# Patient Record
Sex: MALE | Race: WHITE | NOT HISPANIC OR LATINO
[De-identification: names, ages, dates, MRNs, and addresses within clinical notes are randomized per-mention and may not be internally consistent; named-entity substitution may affect disease eponyms.]

---

## 2017-05-24 ENCOUNTER — RESULT REVIEW (OUTPATIENT)
Age: 67
End: 2017-05-24

## 2017-06-06 ENCOUNTER — APPOINTMENT (OUTPATIENT)
Dept: UROLOGY | Facility: CLINIC | Age: 67
End: 2017-06-06

## 2018-01-16 ENCOUNTER — APPOINTMENT (OUTPATIENT)
Dept: UROLOGY | Facility: CLINIC | Age: 68
End: 2018-01-16
Payer: MEDICARE

## 2018-01-16 VITALS
HEART RATE: 71 BPM | BODY MASS INDEX: 28.29 KG/M2 | SYSTOLIC BLOOD PRESSURE: 114 MMHG | DIASTOLIC BLOOD PRESSURE: 67 MMHG | RESPIRATION RATE: 16 BRPM | WEIGHT: 191 LBS | TEMPERATURE: 98.4 F | HEIGHT: 69 IN

## 2018-01-16 DIAGNOSIS — Z87.898 PERSONAL HISTORY OF OTHER SPECIFIED CONDITIONS: ICD-10-CM

## 2018-01-16 DIAGNOSIS — N35.9 URETHRAL STRICTURE, UNSPECIFIED: ICD-10-CM

## 2018-01-16 DIAGNOSIS — Z85.46 PERSONAL HISTORY OF MALIGNANT NEOPLASM OF PROSTATE: ICD-10-CM

## 2018-01-16 DIAGNOSIS — Z80.0 FAMILY HISTORY OF MALIGNANT NEOPLASM OF DIGESTIVE ORGANS: ICD-10-CM

## 2018-01-16 DIAGNOSIS — H91.90 UNSPECIFIED HEARING LOSS, UNSPECIFIED EAR: ICD-10-CM

## 2018-01-16 DIAGNOSIS — Z86.69 PERSONAL HISTORY OF OTHER DISEASES OF THE NERVOUS SYSTEM AND SENSE ORGANS: ICD-10-CM

## 2018-01-16 PROCEDURE — 99214 OFFICE O/P EST MOD 30 MIN: CPT

## 2018-01-16 RX ORDER — FOLIC ACID 20 MG
CAPSULE ORAL
Refills: 0 | Status: ACTIVE | COMMUNITY

## 2018-01-16 RX ORDER — LOVASTATIN 40 MG/1
40 TABLET ORAL
Qty: 90 | Refills: 0 | Status: ACTIVE | COMMUNITY
Start: 2017-06-28

## 2018-01-16 RX ORDER — DILTIAZEM HYDROCHLORIDE 180 MG/1
180 CAPSULE, EXTENDED RELEASE ORAL
Qty: 180 | Refills: 0 | Status: ACTIVE | COMMUNITY
Start: 2017-05-09

## 2018-01-16 RX ORDER — ASPIRIN 325 MG/1
TABLET, FILM COATED ORAL
Refills: 0 | Status: ACTIVE | COMMUNITY

## 2018-01-16 RX ORDER — NITROGLYCERIN 0.3 MG/1
0.3 TABLET SUBLINGUAL
Qty: 200 | Refills: 0 | Status: ACTIVE | COMMUNITY
Start: 2017-02-21

## 2018-07-22 PROBLEM — Z80.0 FAMILY HISTORY OF COLON CANCER: Status: ACTIVE | Noted: 2018-01-16

## 2018-07-25 ENCOUNTER — APPOINTMENT (OUTPATIENT)
Dept: UROLOGY | Facility: CLINIC | Age: 68
End: 2018-07-25
Payer: MEDICARE

## 2018-07-25 VITALS
SYSTOLIC BLOOD PRESSURE: 130 MMHG | RESPIRATION RATE: 14 BRPM | HEART RATE: 76 BPM | BODY MASS INDEX: 27.51 KG/M2 | HEIGHT: 69.5 IN | WEIGHT: 190 LBS | DIASTOLIC BLOOD PRESSURE: 80 MMHG | OXYGEN SATURATION: 94 %

## 2018-07-25 DIAGNOSIS — L02.91 CUTANEOUS ABSCESS, UNSPECIFIED: ICD-10-CM

## 2018-07-25 DIAGNOSIS — N50.819 TESTICULAR PAIN, UNSPECIFIED: ICD-10-CM

## 2018-07-25 PROCEDURE — 99213 OFFICE O/P EST LOW 20 MIN: CPT

## 2019-02-20 ENCOUNTER — RESULT REVIEW (OUTPATIENT)
Age: 69
End: 2019-02-20

## 2019-09-16 ENCOUNTER — MEDICATION RENEWAL (OUTPATIENT)
Age: 69
End: 2019-09-16

## 2019-10-02 ENCOUNTER — APPOINTMENT (OUTPATIENT)
Dept: UROLOGY | Facility: CLINIC | Age: 69
End: 2019-10-02
Payer: MEDICARE

## 2019-10-02 VITALS
HEART RATE: 74 BPM | SYSTOLIC BLOOD PRESSURE: 142 MMHG | HEIGHT: 69 IN | WEIGHT: 198 LBS | DIASTOLIC BLOOD PRESSURE: 80 MMHG | BODY MASS INDEX: 29.33 KG/M2 | OXYGEN SATURATION: 95 %

## 2019-10-02 PROCEDURE — 99214 OFFICE O/P EST MOD 30 MIN: CPT

## 2019-10-07 ENCOUNTER — RESULT REVIEW (OUTPATIENT)
Age: 69
End: 2019-10-07

## 2019-10-07 LAB — PSA SERPL-MCNC: 0.15 NG/ML

## 2019-10-11 NOTE — ASSESSMENT
[FreeTextEntry1] : PSA earlier this month at [facility] was 0.156 ng/mL, will repeat PSA today.\par Continue mirabegron 50 mg once daily, RX renewed.

## 2019-10-11 NOTE — HISTORY OF PRESENT ILLNESS
[FreeTextEntry1] : H/o prostate cancer\par s/p Chriss RP, PLND June 2014\par Path: Alber 7 (3+4), pT3N0, pos margin at apex.  \par No adjuvant therapy.\par \par Here for annual follow up.\par PSA June 2018: 0.08 ng/mL.\par PSA March 2019: 0.08 ng/mL\par PSA July 2019 0.156 ng/mL at sunrise \par \par Has been able to achieve full erections without Viagra.  Has complaints of left testis pain which has been on ongoing for years and moderate to severe at times.  No associated scrotal swelling.\par \par Urinary frequency:  remains on mirabegron 50 mg.  Generally well controlled.  Symptoms exacerbated by stress or anxiety.\par \par No other changes in medical hx.

## 2019-10-11 NOTE — PHYSICAL EXAM
[General Appearance - Well Developed] : well developed [General Appearance - Well Nourished] : well nourished [Normal Appearance] : normal appearance [General Appearance - In No Acute Distress] : no acute distress [Well Groomed] : well groomed [Abdomen Soft] : soft [Costovertebral Angle Tenderness] : no ~M costovertebral angle tenderness [Abdomen Tenderness] : non-tender [Urinary Bladder Findings] : the bladder was normal on palpation [Urethral Meatus] : meatus normal [Penis Abnormality] : normal circumcised penis [Testes Mass (___cm)] : there were no testicular masses [Edema] : no peripheral edema [] : no respiratory distress [Respiration, Rhythm And Depth] : normal respiratory rhythm and effort [Exaggerated Use Of Accessory Muscles For Inspiration] : no accessory muscle use [Oriented To Time, Place, And Person] : oriented to person, place, and time [Affect] : the affect was normal [Normal Station and Gait] : the gait and station were normal for the patient's age [FreeTextEntry1] : two small sebaceous cysts noted: one on the dorsum of the penis just proximal to the glands, the other located on the scrotum just distal to the penile shaft in the midline.

## 2019-11-14 ENCOUNTER — RESULT CHARGE (OUTPATIENT)
Age: 69
End: 2019-11-14

## 2019-11-25 ENCOUNTER — APPOINTMENT (OUTPATIENT)
Dept: SPINE | Facility: CLINIC | Age: 69
End: 2019-11-25
Payer: MEDICARE

## 2019-11-25 VITALS
BODY MASS INDEX: 29.33 KG/M2 | DIASTOLIC BLOOD PRESSURE: 79 MMHG | TEMPERATURE: 98 F | SYSTOLIC BLOOD PRESSURE: 132 MMHG | OXYGEN SATURATION: 92 % | HEART RATE: 93 BPM | WEIGHT: 198 LBS | HEIGHT: 69 IN

## 2019-11-25 DIAGNOSIS — Z87.09 PERSONAL HISTORY OF OTHER DISEASES OF THE RESPIRATORY SYSTEM: ICD-10-CM

## 2019-11-25 DIAGNOSIS — Z86.69 PERSONAL HISTORY OF OTHER DISEASES OF THE NERVOUS SYSTEM AND SENSE ORGANS: ICD-10-CM

## 2019-11-25 DIAGNOSIS — Z86.79 PERSONAL HISTORY OF OTHER DISEASES OF THE CIRCULATORY SYSTEM: ICD-10-CM

## 2019-11-25 DIAGNOSIS — Z86.39 PERSONAL HISTORY OF OTHER ENDOCRINE, NUTRITIONAL AND METABOLIC DISEASE: ICD-10-CM

## 2019-11-25 DIAGNOSIS — Z85.038 PERSONAL HISTORY OF OTHER MALIGNANT NEOPLASM OF LARGE INTESTINE: ICD-10-CM

## 2019-11-25 PROCEDURE — 99203 OFFICE O/P NEW LOW 30 MIN: CPT

## 2019-11-25 RX ORDER — FLUTICASONE PROPIONATE 220 MCG
AEROSOL WITH ADAPTER (GRAM) INHALATION
Refills: 0 | Status: ACTIVE | COMMUNITY

## 2019-11-25 RX ORDER — ELECTROLYTES/DEXTROSE
SOLUTION, ORAL ORAL
Refills: 0 | Status: ACTIVE | COMMUNITY

## 2019-11-25 NOTE — REASON FOR VISIT
[Follow-Up: _____] : a [unfilled] follow-up visit [Other: _____] : [unfilled] [FreeTextEntry1] : This 69 year old gentleman is being seen for sudden onset of lower back pain when he suddenly moved quickly.  He is well know to the office for a prior lumbar fusion of L5 S1  in 2003.  The current pain he is experiencing now is on the left side of is back with left sided groin pain and no leg radiculopathy.  He has not had pain management and no PT initiated.  The pain is a 10/10.    He has an unsteady gait. He does not describe any incontinence. An MRI of the lumbar spine shows a left L1 to disc herniation with a superiorly extruded fragment along with degenerative disc disease at L2-3 L3-4 L4-5 there is also a grade 1 spondylolisthesis central stenosis. This patient is on chronic narcotics with pain management.

## 2019-11-25 NOTE — ASSESSMENT
[FreeTextEntry1] : Mr. Ayala is s/p L S1 lumbar fusion from 2003 who suddenly experienced recurrent left lower back pain.  No radicular pain is present.   The lumbar  MRI  show junctional  stenosis above the level of prior fusion.  A large herniated disc is present at L1 L2 and spondylolisthesis is present at L4 L5.  An extensive lumbar fusion was discussed and conservative measures were recommended.  He will follow up with pain management for epidural injections, surgery was not recommended at this time.   He will return to the office in three to four months .

## 2019-11-25 NOTE — PHYSICAL EXAM
[Person] : oriented to person [Place] : oriented to place [Time] : oriented to time [Short Term Intact] : short term memory intact [Remote Intact] : remote memory intact [Span Intact] : the attention span was normal [Concentration Intact] : normal concentrating ability [Fluency] : fluency intact [Past History] : adequate knowledge of personal past history [Current Events] : adequate knowledge of current events [Comprehension] : comprehension intact [Cranial Nerves Optic (II)] : visual acuity intact bilaterally,  pupils equal round and reactive to light [Vocabulary] : adequate range of vocabulary [Cranial Nerves Trigeminal (V)] : facial sensation intact symmetrically [Cranial Nerves Facial (VII)] : face symmetrical [Cranial Nerves Oculomotor (III)] : extraocular motion intact [Cranial Nerves Vestibulocochlear (VIII)] : hearing was intact bilaterally [Cranial Nerves Accessory (XI - Cranial And Spinal)] : head turning and shoulder shrug symmetric [Cranial Nerves Glossopharyngeal (IX)] : tongue and palate midline [Cranial Nerves Hypoglossal (XII)] : there was no tongue deviation with protrusion [Motor Tone] : muscle tone was normal in all four extremities [No Muscle Atrophy] : normal bulk in all four extremities [Motor Strength] : muscle strength was normal in all four extremities [Sensation Tactile Decrease] : light touch was intact [Limited Balance] : the patient's balance was impaired [2+] : Patella left 2+ [Past-pointing] : there was no past-pointing [Tremor] : no tremor present [Munoz] : Munoz's sign was not demonstrated [FreeTextEntry8] : antalgic gait [Straight-Leg Raise Test - Left] : straight leg raise of the left leg was negative [Straight-Leg Raise Test - Right] : straight leg raise  of the right leg was negative [FreeTextEntry1] : well-healed lower lumbar paraspinal skin incisions

## 2020-01-15 ENCOUNTER — APPOINTMENT (OUTPATIENT)
Dept: UROLOGY | Facility: CLINIC | Age: 70
End: 2020-01-15

## 2020-01-21 ENCOUNTER — APPOINTMENT (OUTPATIENT)
Dept: UROLOGY | Facility: CLINIC | Age: 70
End: 2020-01-21
Payer: MEDICARE

## 2020-01-21 LAB — PSA SERPL-MCNC: 0.19 NG/ML

## 2020-01-21 PROCEDURE — 99214 OFFICE O/P EST MOD 30 MIN: CPT

## 2020-01-21 NOTE — PHYSICAL EXAM
[General Appearance - Well Developed] : well developed [General Appearance - Well Nourished] : well nourished [Normal Appearance] : normal appearance [Well Groomed] : well groomed [General Appearance - In No Acute Distress] : no acute distress [Abdomen Soft] : soft [Abdomen Tenderness] : non-tender [Costovertebral Angle Tenderness] : no ~M costovertebral angle tenderness [Urinary Bladder Findings] : the bladder was normal on palpation [Edema] : no peripheral edema [] : no respiratory distress [Respiration, Rhythm And Depth] : normal respiratory rhythm and effort [Exaggerated Use Of Accessory Muscles For Inspiration] : no accessory muscle use [Normal Station and Gait] : the gait and station were normal for the patient's age [No Palpable Adenopathy] : no palpable adenopathy [FreeTextEntry1] : PVR 59 mL

## 2020-01-21 NOTE — HISTORY OF PRESENT ILLNESS
[FreeTextEntry1] : H/o prostate cancer\par s/p Chriss RP, PLND June 2014\par Path: Alber 7 (3+4), pT3N0, pos margin at apex.  \par No adjuvant therapy.\par \par Rising PSA posttreatment.\par PSA 1/7/2020: 0.19 ng/mL.\par PSA 10/2/2019: 0.15 ng/mL.\par \par Urinary frequency:  remains on mirabegron 50 mg.  Recently had exacerbation of his lumbar spine stenosis.  Has developed weakness of the right lower extremity.  At the same time noted an improvement in urinary frequency.  Urine flow and bladder emptying remain normal.\par \par Had MRI of the spine which showed progression of his lumbar disc disease and stenosis.\par \par No other changes in medical hx.

## 2020-01-21 NOTE — ASSESSMENT
[FreeTextEntry1] : Reviewed PSA results.  We will continue to follow with PSA every 3 months.  Discussed the role of Axumin PET/CT if his PSA rises to above 0.3 ng/mL.\par \par Urinary frequency: Reviewed the MRI report and images.  Decrease in urinary frequency likely secondary to some neurologic involvement of the distal cord.  Agree with recommendation for surgical reconstruction if needed.  No need for change in medication regarding urinary function.

## 2020-03-16 ENCOUNTER — APPOINTMENT (OUTPATIENT)
Dept: SPINE | Facility: CLINIC | Age: 70
End: 2020-03-16

## 2020-05-11 LAB — PSA SERPL-MCNC: 0.2 NG/ML

## 2020-06-08 ENCOUNTER — APPOINTMENT (OUTPATIENT)
Dept: SPINE | Facility: CLINIC | Age: 70
End: 2020-06-08

## 2020-06-18 PROBLEM — N50.819 ORCHALGIA: Status: ACTIVE | Noted: 2018-07-25

## 2020-09-29 ENCOUNTER — APPOINTMENT (OUTPATIENT)
Dept: UROLOGY | Facility: CLINIC | Age: 70
End: 2020-09-29
Payer: MEDICARE

## 2020-09-29 VITALS
HEART RATE: 90 BPM | BODY MASS INDEX: 29.47 KG/M2 | WEIGHT: 199 LBS | DIASTOLIC BLOOD PRESSURE: 81 MMHG | HEIGHT: 69 IN | SYSTOLIC BLOOD PRESSURE: 145 MMHG | RESPIRATION RATE: 17 BRPM

## 2020-09-29 VITALS — TEMPERATURE: 97.3 F

## 2020-09-29 LAB — PSA SERPL-MCNC: 0.21 NG/ML

## 2020-09-29 PROCEDURE — 99214 OFFICE O/P EST MOD 30 MIN: CPT

## 2020-09-29 RX ORDER — MIRABEGRON 50 MG/1
50 TABLET, FILM COATED, EXTENDED RELEASE ORAL
Refills: 0 | Status: COMPLETED | COMMUNITY
End: 2020-09-29

## 2020-09-29 NOTE — HISTORY OF PRESENT ILLNESS
[FreeTextEntry1] : H/o prostate cancer\par s/p Chriss RP, PLND June 2014\par Path: Alber 7 (3+4), pT3N0, pos margin at apex.  \par No adjuvant therapy.\par \par Rising PSA posttreatment.\par \par Urinary frequency:  remains on mirabegron 50 mg.  Recently had exacerbation of his lumbar spine stenosis.  Has developed weakness of the right lower extremity.  At the same time noted an improvement in urinary frequency.  Urine flow and bladder emptying remain normal.\par \par Had MRI of the spine which showed progression of his lumbar disc disease and stenosis.\par \par No other changes in medical hx.

## 2020-09-29 NOTE — DISEASE MANAGEMENT
[1] : T1 [c] : c [X] : MX [0-10] : 0 -10 ng/mL [7(4+3)] : Alber Score 7(4+3) [4] : 4 [IIIB] : IIIB [Radical Prostatectomy] : Radical Prostatectomy [Patient had a radical prostatectomy] : Patient had a radical prostatectomy  [7(3+4)] : Alber Score 7(3+4) [Negative] : Negative margins [3] : T3 [a] : a [0] : N0 [] : Patient had no Bone Scan performed [BiopsyDate] : 4/3/2014 [MeasuredProstateVolume] : 29 mL [TotalCores] : 18 [TotalPositiveCores] : 10 [MaxCoreInvolvement] : 75% [RadicalProstatectomyDate] : 06/30/2014 [TotalNumberofnodesresected] : 18 [PositiveNodes] : 0

## 2020-10-06 LAB
ESTRADIOL SERPL-MCNC: 24 PG/ML
TESTOST BND SERPL-MCNC: 6.5 PG/ML
TESTOST SERPL-MCNC: 698.2 NG/DL

## 2020-11-16 ENCOUNTER — NON-APPOINTMENT (OUTPATIENT)
Age: 70
End: 2020-11-16

## 2020-11-19 ENCOUNTER — APPOINTMENT (OUTPATIENT)
Dept: UROLOGY | Facility: CLINIC | Age: 70
End: 2020-11-19
Payer: MEDICARE

## 2020-11-19 DIAGNOSIS — R39.12 POOR URINARY STREAM: ICD-10-CM

## 2020-11-19 LAB
BILIRUB UR QL STRIP: NEGATIVE
CLARITY UR: NORMAL
COLLECTION METHOD: NORMAL
GLUCOSE UR-MCNC: NEGATIVE
HCG UR QL: 0.2 EU/DL
HGB UR QL STRIP.AUTO: NORMAL
KETONES UR-MCNC: NEGATIVE
LEUKOCYTE ESTERASE UR QL STRIP: NEGATIVE
NITRITE UR QL STRIP: NEGATIVE
PH UR STRIP: 7.5
PROT UR STRIP-MCNC: NORMAL
SP GR UR STRIP: 1.02

## 2020-11-19 PROCEDURE — 99214 OFFICE O/P EST MOD 30 MIN: CPT

## 2020-11-24 LAB
ANION GAP SERPL CALC-SCNC: 12 MMOL/L
BUN SERPL-MCNC: 20 MG/DL
CALCIUM SERPL-MCNC: 9.6 MG/DL
CHLORIDE SERPL-SCNC: 101 MMOL/L
CO2 SERPL-SCNC: 26 MMOL/L
CREAT SERPL-MCNC: 1.1 MG/DL
GLUCOSE SERPL-MCNC: 106 MG/DL
POTASSIUM SERPL-SCNC: 4.7 MMOL/L
PSA SERPL-MCNC: 0.28 NG/ML
SODIUM SERPL-SCNC: 138 MMOL/L

## 2020-11-24 NOTE — HISTORY OF PRESENT ILLNESS
[FreeTextEntry1] : H/o prostate cancer\par s/p Chriss RP, PLND June 2014\par Path: Alber 7 (3+4), pT3N0, pos margin at apex.  \par No adjuvant therapy.\par Most recent PSA 0.21 ng/mL, 9/9/2020\par \par Had MRI of the spine which showed progression of his lumbar disc disease and stenosis.\par \par Several days ago onset of back pain, worsening difficulty to void.  Feels that not emptying the bladder. At baseline, voids very frequently.  No incontinence.  No abdominal pain.  No fever/chills, N/V.  No constipation.

## 2020-11-24 NOTE — PHYSICAL EXAM
[General Appearance - Well Developed] : well developed [General Appearance - Well Nourished] : well nourished [Normal Appearance] : normal appearance [Well Groomed] : well groomed [General Appearance - In No Acute Distress] : no acute distress [Abdomen Soft] : soft [Abdomen Tenderness] : non-tender [Costovertebral Angle Tenderness] : no ~M costovertebral angle tenderness [Urinary Bladder Findings] : the bladder was normal on palpation [Heart Rate And Rhythm] : Heart rate and rhythm were normal [Edema] : no peripheral edema [] : no respiratory distress [Respiration, Rhythm And Depth] : normal respiratory rhythm and effort [Exaggerated Use Of Accessory Muscles For Inspiration] : no accessory muscle use [Normal Station and Gait] : the gait and station were normal for the patient's age [No Palpable Adenopathy] : no palpable adenopathy

## 2020-11-27 ENCOUNTER — OUTPATIENT (OUTPATIENT)
Dept: OUTPATIENT SERVICES | Facility: HOSPITAL | Age: 70
LOS: 1 days | End: 2020-11-27
Payer: COMMERCIAL

## 2020-11-27 ENCOUNTER — APPOINTMENT (OUTPATIENT)
Dept: CT IMAGING | Facility: CLINIC | Age: 70
End: 2020-11-27
Payer: MEDICARE

## 2020-11-27 DIAGNOSIS — H53.009 UNSPECIFIED AMBLYOPIA, UNSPECIFIED EYE: Chronic | ICD-10-CM

## 2020-11-27 DIAGNOSIS — M54.9 DORSALGIA, UNSPECIFIED: ICD-10-CM

## 2020-11-27 DIAGNOSIS — R35.0 FREQUENCY OF MICTURITION: ICD-10-CM

## 2020-11-27 DIAGNOSIS — H26.9 UNSPECIFIED CATARACT: Chronic | ICD-10-CM

## 2020-11-27 DIAGNOSIS — Z98.89 OTHER SPECIFIED POSTPROCEDURAL STATES: Chronic | ICD-10-CM

## 2020-11-27 DIAGNOSIS — Z98.1 ARTHRODESIS STATUS: Chronic | ICD-10-CM

## 2020-11-27 PROCEDURE — 74176 CT ABD & PELVIS W/O CONTRAST: CPT

## 2020-11-27 PROCEDURE — 74176 CT ABD & PELVIS W/O CONTRAST: CPT | Mod: 26

## 2020-11-29 NOTE — ASSESSMENT
[FreeTextEntry1] : Postvoid residual: Less than 10 mL.\par Recommend labs today including CMP, PSA.\par Urinalysis dip suspicious for possible urinary tract infection.  Prescription for Bactrim twice daily sent to the pharmacy.\par \par If persistent symptoms, then will schedule for CT scan abdomen/pelvis to rule out distal ureteral stones. This document is complete and the patient is ready for discharge.

## 2020-11-30 ENCOUNTER — NON-APPOINTMENT (OUTPATIENT)
Age: 70
End: 2020-11-30

## 2021-01-11 ENCOUNTER — APPOINTMENT (OUTPATIENT)
Dept: SPINE | Facility: CLINIC | Age: 71
End: 2021-01-11
Payer: MEDICARE

## 2021-01-11 VITALS
BODY MASS INDEX: 28.44 KG/M2 | SYSTOLIC BLOOD PRESSURE: 136 MMHG | HEART RATE: 81 BPM | WEIGHT: 192 LBS | DIASTOLIC BLOOD PRESSURE: 83 MMHG | HEIGHT: 69 IN | OXYGEN SATURATION: 95 % | TEMPERATURE: 98.7 F

## 2021-01-11 DIAGNOSIS — M54.9 DORSALGIA, UNSPECIFIED: ICD-10-CM

## 2021-01-11 DIAGNOSIS — F17.200 NICOTINE DEPENDENCE, UNSPECIFIED, UNCOMPLICATED: ICD-10-CM

## 2021-01-11 PROCEDURE — 99213 OFFICE O/P EST LOW 20 MIN: CPT

## 2021-01-11 PROCEDURE — 99072 ADDL SUPL MATRL&STAF TM PHE: CPT
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
## 2021-01-11 NOTE — REVIEW OF SYSTEMS
[Leg Weakness] : leg weakness [Negative] : Heme/Lymph [de-identified] : lower back pain and leg pain
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
## 2021-03-19 LAB — PSA SERPL-MCNC: 0.3 NG/ML

## 2021-07-13 LAB — PSA SERPL-MCNC: 0.37 NG/ML

## 2021-09-09 ENCOUNTER — NON-APPOINTMENT (OUTPATIENT)
Age: 71
End: 2021-09-09

## 2021-09-13 ENCOUNTER — APPOINTMENT (OUTPATIENT)
Dept: UROLOGY | Facility: CLINIC | Age: 71
End: 2021-09-13
Payer: MEDICARE

## 2021-09-13 VITALS — SYSTOLIC BLOOD PRESSURE: 146 MMHG | HEART RATE: 94 BPM | DIASTOLIC BLOOD PRESSURE: 88 MMHG

## 2021-09-13 LAB — PSA SERPL-MCNC: 0.44 NG/ML

## 2021-09-13 PROCEDURE — 99214 OFFICE O/P EST MOD 30 MIN: CPT

## 2021-09-13 RX ORDER — PREDNISONE 10 MG/1
10 TABLET ORAL
Qty: 18 | Refills: 0 | Status: ACTIVE | COMMUNITY
Start: 2021-05-27

## 2021-09-13 RX ORDER — CLINDAMYCIN HYDROCHLORIDE 150 MG/1
150 CAPSULE ORAL
Qty: 28 | Refills: 0 | Status: ACTIVE | COMMUNITY
Start: 2021-08-20

## 2021-09-13 RX ORDER — ISOSORBIDE MONONITRATE 30 MG/1
30 TABLET, EXTENDED RELEASE ORAL
Qty: 30 | Refills: 0 | Status: ACTIVE | COMMUNITY
Start: 2021-09-08

## 2021-09-13 RX ORDER — IBUPROFEN 600 MG/1
600 TABLET, FILM COATED ORAL
Qty: 20 | Refills: 0 | Status: ACTIVE | COMMUNITY
Start: 2021-08-20

## 2021-09-13 RX ORDER — OXYCODONE HYDROCHLORIDE 10 MG/1
10 TABLET, FILM COATED, EXTENDED RELEASE ORAL
Qty: 90 | Refills: 0 | Status: ACTIVE | COMMUNITY
Start: 2021-05-24

## 2021-09-13 RX ORDER — AMLODIPINE BESYLATE 2.5 MG/1
2.5 TABLET ORAL
Qty: 90 | Refills: 0 | Status: ACTIVE | COMMUNITY
Start: 2021-05-14

## 2021-09-13 RX ORDER — IPRATROPIUM BROMIDE 42 UG/1
0.06 SPRAY NASAL
Qty: 75 | Refills: 0 | Status: ACTIVE | COMMUNITY
Start: 2021-02-24

## 2021-09-13 RX ORDER — ALBUTEROL SULFATE 90 UG/1
108 (90 BASE) AEROSOL, METERED RESPIRATORY (INHALATION)
Qty: 54 | Refills: 0 | Status: ACTIVE | COMMUNITY
Start: 2021-08-06

## 2021-09-13 RX ORDER — SULFAMETHOXAZOLE AND TRIMETHOPRIM 800; 160 MG/1; MG/1
800-160 TABLET ORAL TWICE DAILY
Qty: 14 | Refills: 0 | Status: COMPLETED | COMMUNITY
Start: 2020-11-19 | End: 2021-09-13

## 2021-09-13 RX ORDER — DOXYCYCLINE HYCLATE 100 MG/1
100 TABLET ORAL
Qty: 20 | Refills: 0 | Status: ACTIVE | COMMUNITY
Start: 2021-05-27

## 2021-09-13 RX ORDER — AMLODIPINE BESYLATE 5 MG/1
5 TABLET ORAL
Qty: 90 | Refills: 0 | Status: ACTIVE | COMMUNITY
Start: 2021-08-19

## 2021-09-13 RX ORDER — FLUTICASONE FUROATE AND VILANTEROL TRIFENATATE 200; 25 UG/1; UG/1
200-25 POWDER RESPIRATORY (INHALATION)
Qty: 60 | Refills: 0 | Status: ACTIVE | COMMUNITY
Start: 2020-11-06

## 2021-09-13 NOTE — HISTORY OF PRESENT ILLNESS
[FreeTextEntry1] : H/o prostate cancer\par s/p Chriss RP, PLND June 2014\par Path: Alber 7 (3+4), pT3N0, pos margin at apex.  \par No adjuvant therapy.\par Rising PSA, see below.  PSA last week 0.44 ng/mL.\par \par Remains on mirabegron 50 mg daily.\par Continues to have daytime frequency but no significant nocturia.\par Minimal incontinence.\par No hematuria, dysuria.

## 2021-09-13 NOTE — PHYSICAL EXAM
[General Appearance - Well Developed] : well developed [Normal Appearance] : normal appearance [Bowel Sounds] : normal bowel sounds [Urinary Bladder Findings] : the bladder was normal on palpation [Skin Color & Pigmentation] : normal skin color and pigmentation [Heart Rate And Rhythm] : Heart rate and rhythm were normal [] : no respiratory distress [Oriented To Time, Place, And Person] : oriented to person, place, and time [Normal Station and Gait] : the gait and station were normal for the patient's age [No Focal Deficits] : no focal deficits

## 2021-09-13 NOTE — ASSESSMENT
[FreeTextEntry1] : Rising PSA. Discussed natural hx and risk of local and/or metastatic recurrence.\par Given existing bladder function, high risk for urinary complications from salvage pelvic RT.\par Discussed both Axumin and Pylarify PET/CT.\par Repeat PSA in January 2022.\par Will call with results.

## 2022-01-07 LAB — PSA SERPL-MCNC: 0.42 NG/ML

## 2022-07-11 ENCOUNTER — APPOINTMENT (OUTPATIENT)
Dept: UROLOGY | Facility: CLINIC | Age: 72
End: 2022-07-11

## 2022-08-02 ENCOUNTER — NON-APPOINTMENT (OUTPATIENT)
Age: 72
End: 2022-08-02

## 2022-09-21 ENCOUNTER — RX RENEWAL (OUTPATIENT)
Age: 72
End: 2022-09-21

## 2022-10-24 ENCOUNTER — APPOINTMENT (OUTPATIENT)
Dept: UROLOGY | Facility: CLINIC | Age: 72
End: 2022-10-24

## 2022-10-24 VITALS
WEIGHT: 202 LBS | RESPIRATION RATE: 17 BRPM | BODY MASS INDEX: 29.92 KG/M2 | HEART RATE: 90 BPM | HEIGHT: 69 IN | DIASTOLIC BLOOD PRESSURE: 84 MMHG | SYSTOLIC BLOOD PRESSURE: 153 MMHG

## 2022-10-24 DIAGNOSIS — R35.0 FREQUENCY OF MICTURITION: ICD-10-CM

## 2022-10-24 PROCEDURE — 99213 OFFICE O/P EST LOW 20 MIN: CPT

## 2022-10-24 RX ORDER — TOBRAMYCIN AND DEXAMETHASONE 3; 1 MG/ML; MG/ML
0.3-0.1 SUSPENSION/ DROPS OPHTHALMIC
Qty: 5 | Refills: 0 | Status: DISCONTINUED | COMMUNITY
Start: 2022-04-08

## 2022-10-24 RX ORDER — ERYTHROMYCIN 5 MG/G
5 OINTMENT OPHTHALMIC
Qty: 3 | Refills: 0 | Status: DISCONTINUED | COMMUNITY
Start: 2022-04-25

## 2022-10-24 RX ORDER — NITROGLYCERIN 0.4 MG/1
0.4 TABLET SUBLINGUAL
Qty: 25 | Refills: 0 | Status: DISCONTINUED | COMMUNITY
Start: 2022-10-19

## 2022-10-24 RX ORDER — MUPIROCIN 20 MG/G
2 OINTMENT TOPICAL
Qty: 22 | Refills: 0 | Status: DISCONTINUED | COMMUNITY
Start: 2022-09-29

## 2022-10-24 RX ORDER — CLOPIDOGREL BISULFATE 75 MG/1
75 TABLET, FILM COATED ORAL
Qty: 90 | Refills: 0 | Status: COMPLETED | COMMUNITY
Start: 2022-04-21 | End: 2022-10-24

## 2022-10-24 RX ORDER — BENZONATATE 200 MG/1
200 CAPSULE ORAL
Qty: 30 | Refills: 0 | Status: COMPLETED | COMMUNITY
Start: 2021-04-20 | End: 2022-10-24

## 2022-10-24 RX ORDER — MOLNUPIRAVIR 200 MG/1
200 CAPSULE ORAL
Qty: 40 | Refills: 0 | Status: DISCONTINUED | COMMUNITY
Start: 2022-07-08

## 2022-10-24 RX ORDER — DOXYCYCLINE HYCLATE 100 MG/1
100 CAPSULE ORAL
Qty: 14 | Refills: 0 | Status: DISCONTINUED | COMMUNITY
Start: 2022-09-19

## 2022-10-24 RX ORDER — DOXYCYCLINE 100 MG/1
100 CAPSULE ORAL
Qty: 14 | Refills: 0 | Status: DISCONTINUED | COMMUNITY
Start: 2022-07-08

## 2022-10-24 RX ORDER — VERAPAMIL HYDROCHLORIDE 120 MG/1
120 TABLET ORAL
Qty: 90 | Refills: 0 | Status: DISCONTINUED | COMMUNITY
Start: 2022-08-10

## 2022-10-24 RX ORDER — EZETIMIBE 10 MG/1
10 TABLET ORAL
Qty: 90 | Refills: 0 | Status: DISCONTINUED | COMMUNITY
Start: 2022-04-21

## 2022-10-24 RX ORDER — PREDNISONE 20 MG/1
20 TABLET ORAL
Qty: 5 | Refills: 0 | Status: DISCONTINUED | COMMUNITY
Start: 2022-07-08

## 2022-10-24 NOTE — HISTORY OF PRESENT ILLNESS
[FreeTextEntry1] : Remains on Myrbetriq he states that frequency has decreased. Gets about 2 strong urinary streams a day. He feels that the rest can be weak or fair. Denies gross hematuria. Denies dysuria. PVR performed in office 0 ml. \par \par Oct 17th 2022 PSA - 0.30 ng/mL.

## 2022-10-24 NOTE — DISEASE MANAGEMENT
[1] : T1 [c] : c [X] : MX [0-10] : 0 -10 ng/mL [4] : 4 [IIIB] : IIIB [Radical Prostatectomy] : Radical Prostatectomy [Patient had a radical prostatectomy] : Patient had a radical prostatectomy  [7(3+4)] : Alber Score 7(3+4) [Negative] : Negative margins [3] : T3 [a] : a [0] : N0 [] : Patient had no Bone Scan performed [MeasuredProstateVolume] : 29 mL [TotalCores] : 18 [TotalPositiveCores] : 10 [MaxCoreInvolvement] : 75% [RadicalProstatectomyDate] : 06/30/2014 [TotalNumberofnodesresected] : 18 [PositiveNodes] : 0

## 2023-04-03 ENCOUNTER — APPOINTMENT (OUTPATIENT)
Dept: NUCLEAR MEDICINE | Facility: CLINIC | Age: 73
End: 2023-04-03
Payer: MEDICARE

## 2023-04-03 ENCOUNTER — OUTPATIENT (OUTPATIENT)
Dept: OUTPATIENT SERVICES | Facility: HOSPITAL | Age: 73
LOS: 1 days | End: 2023-04-03
Payer: COMMERCIAL

## 2023-04-03 DIAGNOSIS — Z98.89 OTHER SPECIFIED POSTPROCEDURAL STATES: Chronic | ICD-10-CM

## 2023-04-03 DIAGNOSIS — Z98.1 ARTHRODESIS STATUS: Chronic | ICD-10-CM

## 2023-04-03 DIAGNOSIS — H53.009 UNSPECIFIED AMBLYOPIA, UNSPECIFIED EYE: Chronic | ICD-10-CM

## 2023-04-03 DIAGNOSIS — R97.21 RISING PSA FOLLOWING TREATMENT FOR MALIGNANT NEOPLASM OF PROSTATE: ICD-10-CM

## 2023-04-03 DIAGNOSIS — H26.9 UNSPECIFIED CATARACT: Chronic | ICD-10-CM

## 2023-04-03 PROCEDURE — 78816 PET IMAGE W/CT FULL BODY: CPT

## 2023-04-03 PROCEDURE — A9595: CPT

## 2023-04-03 PROCEDURE — 78816 PET IMAGE W/CT FULL BODY: CPT | Mod: 26

## 2023-04-04 ENCOUNTER — APPOINTMENT (OUTPATIENT)
Dept: UROLOGY | Facility: CLINIC | Age: 73
End: 2023-04-04
Payer: MEDICARE

## 2023-04-04 VITALS
RESPIRATION RATE: 17 BRPM | SYSTOLIC BLOOD PRESSURE: 134 MMHG | HEIGHT: 69 IN | DIASTOLIC BLOOD PRESSURE: 72 MMHG | HEART RATE: 105 BPM | BODY MASS INDEX: 29.33 KG/M2 | OXYGEN SATURATION: 95 % | WEIGHT: 198 LBS

## 2023-04-04 DIAGNOSIS — R94.8 ABNORMAL RESULTS OF FUNCTION STUDIES OF OTHER ORGANS AND SYSTEMS: ICD-10-CM

## 2023-04-04 PROCEDURE — 99214 OFFICE O/P EST MOD 30 MIN: CPT

## 2023-04-04 NOTE — HISTORY OF PRESENT ILLNESS
[FreeTextEntry1] : Remains on Myrbetriq he states that frequency has decreased. Gets about 2 strong urinary streams a day. He feels that the rest can be weak or fair. Denies gross hematuria. Denies dysuria.\par \par 10/17/2022 PSA 0.30 ng/mL.\par 12/19/2022 PSA 0.37 ng/mL\par 3/14/2023 PSA 0.6 ng/mL\par \par Underwent PSMA PET/CT yesterday 4/3/2023.  Findings show no specific uptake suspicious for recurrent or metastatic prostate cancer.  Nonspecific thickening of the rectum.  Abnormal uptake seen in the pancreas which is also nonspecific.\par \par Otherwise the patient is doing well.

## 2023-04-04 NOTE — DISEASE MANAGEMENT
[1] : T1 [c] : c [0-10] : 0 -10 ng/mL [Biopsy with Fusion] : Patient had a biopsy with fusion on [7(4+3)] : Template Biopsy [place] Score: 7(4+3) [Biopsy results sent to PCP/Referring Physician] : Biopsy results sent to PCP/Referring Physician [4] : 4 [IIIB] : IIIB [Radical Prostatectomy] : Radical Prostatectomy [Patient had a radical prostatectomy] : Patient had a radical prostatectomy  [7(3+4)] : Alber Score 7(3+4) [Negative] : Negative margins [3] : T3 [a] : a [0] : N0 [X] : MX [] : Patient had no Bone Scan performed [BiopsyDate] : 4/3/2014 [MeasuredProstateVolume] : 29 mL [TotalCores] : 18 [TotalPositiveCores] : 10 [MaxCoreInvolvement] : 75% [FreeTextEntry7] : PSA at diagnosis 4.8 ng/mL [RadicalProstatectomyDate] : 06/30/2014 [TotalNumberofnodesresected] : 18 [PositiveNodes] : 0 [FreeTextEntry1] : 5/5/2020 PSA 0.2 ng/mL [RecurrenceDate] : 5/5/2020

## 2023-08-15 ENCOUNTER — APPOINTMENT (OUTPATIENT)
Dept: UROLOGY | Facility: CLINIC | Age: 73
End: 2023-08-15

## 2023-08-17 RX ORDER — MIRABEGRON 50 MG/1
50 TABLET, FILM COATED, EXTENDED RELEASE ORAL
Qty: 90 | Refills: 3 | Status: ACTIVE | COMMUNITY
Start: 2017-06-14 | End: 1900-01-01

## 2023-09-14 ENCOUNTER — NON-APPOINTMENT (OUTPATIENT)
Age: 73
End: 2023-09-14

## 2023-11-06 NOTE — LETTER BODY
Pt's daughter Haleigh is calling because her father is being d/c from SNF today on lovenox BID and warfarin. They have been increasing his warfarin dose, last INR was 11/2=1.5. So they increase him on 11/3 to 4mg daily. They needed dosing instructions for home, he currently has 2.5mg tablets, so I suggested 3.75mg daily until seen on Friday 11/10. He tested positive for COVID, but has no symptoms. He will test again before coming into clinic. He will also continue lovenox twice daily until seen, except don't take Friday AM until seen.      Zeyad Puckett, PharmD 11/6/2023 3:38 PM [Dear  ___] : Dear  [unfilled], [FreeTextEntry1] : I had the pleasure of seeing your patient, ALF BOLES, in the office today.  \par \par Please see my office note below.\par \par Thank you for allowing me to participate in his care and please do not hesitate to contact me with any questions or concerns regarding his care.\par \par Sincerely,\par \par Karsten Vang MD\par Chief of Urology, [facility]\par  of Urology\par [address] [place] [address], [place] [address]\[place] [address]\par PH:      [phone]\par Email:  [email or web address]

## 2024-03-14 ENCOUNTER — APPOINTMENT (OUTPATIENT)
Dept: UROLOGY | Facility: CLINIC | Age: 74
End: 2024-03-14
Payer: MEDICARE

## 2024-03-14 VITALS
BODY MASS INDEX: 28.19 KG/M2 | DIASTOLIC BLOOD PRESSURE: 75 MMHG | RESPIRATION RATE: 16 BRPM | HEART RATE: 65 BPM | WEIGHT: 186 LBS | HEIGHT: 68 IN | SYSTOLIC BLOOD PRESSURE: 146 MMHG

## 2024-03-14 DIAGNOSIS — Z85.46 PERSONAL HISTORY OF MALIGNANT NEOPLASM OF PROSTATE: ICD-10-CM

## 2024-03-14 DIAGNOSIS — R97.21 RISING PSA FOLLOWING TREATMENT FOR MALIGNANT NEOPLASM OF PROSTATE: ICD-10-CM

## 2024-03-14 PROCEDURE — G2211 COMPLEX E/M VISIT ADD ON: CPT

## 2024-03-14 PROCEDURE — 99213 OFFICE O/P EST LOW 20 MIN: CPT

## 2024-03-15 NOTE — HISTORY OF PRESENT ILLNESS
[FreeTextEntry1] : Here for follow up. Remains on mirabegron 50 mg for overactive bladder symptoms. Has rising PSA post prostatectomy.  10/17/2022 PSA 0.30 ng/mL. 12/19/2022 PSA 0.37 ng/mL 3/14/2023 PSA 0.6 ng/mL   Underwent PSMA PET/CT yesterday 4/3/2023.  Findings show no specific uptake suspicious for recurrent or metastatic prostate cancer.  Nonspecific thickening of the rectum.  Abnormal uptake seen in the pancreas which is also nonspecific.  8/8/2023 0.39 ng/mL 11/24/2023 0.56 ng/mL 2/22/2024 0.53 ng/mL  No other new complaints.

## 2024-03-15 NOTE — DISEASE MANAGEMENT
[1] : T1 [c] : c [0-10] : 0 -10 ng/mL [Biopsy with Fusion] : Patient had a biopsy with fusion on [7(4+3)] : Template Biopsy [place] Score: 7(4+3) [Biopsy results sent to PCP/Referring Physician] : Biopsy results sent to PCP/Referring Physician [] : Patient had no Bone Scan performed [4] : 4 [BiopsyDate] : 4/3/2014 [MeasuredProstateVolume] : 29 mL [TotalCores] : 18 [TotalPositiveCores] : 10 [MaxCoreInvolvement] : 75% [FreeTextEntry7] : PSA at diagnosis 4.8 ng/mL [IIIB] : IIIB [Radical Prostatectomy] : Radical Prostatectomy [Patient had a radical prostatectomy] : Patient had a radical prostatectomy  [7(3+4)] : Alber Score 7(3+4) [3] : T3 [a] : a [Negative] : Negative margins [X] : MX [0] : N0 [RadicalProstatectomyDate] : 06/30/2014 [TotalNumberofnodesresected] : 18 [PositiveNodes] : 0 [RecurrenceDate] : 5/5/2020 [FreeTextEntry1] : 5/5/2020 PSA 0.2 ng/mL

## 2024-03-18 ENCOUNTER — APPOINTMENT (OUTPATIENT)
Dept: SPINE | Facility: CLINIC | Age: 74
End: 2024-03-18
Payer: MEDICARE

## 2024-03-18 DIAGNOSIS — M54.2 CERVICALGIA: ICD-10-CM

## 2024-03-18 DIAGNOSIS — M48.061 SPINAL STENOSIS, LUMBAR REGION WITHOUT NEUROGENIC CLAUDICATION: ICD-10-CM

## 2024-03-18 DIAGNOSIS — M79.602 PAIN IN LEFT ARM: ICD-10-CM

## 2024-03-18 DIAGNOSIS — G89.29 OTHER CHRONIC PAIN: ICD-10-CM

## 2024-03-18 PROCEDURE — 99203 OFFICE O/P NEW LOW 30 MIN: CPT
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
## 2024-03-18 NOTE — ASSESSMENT
[FreeTextEntry1] : 74 year old male 21 years s/p L5-S1 fusion presenting with low back and bilateral leg pain. It was once again discussed with the patient to follow up with pain management for consideration of a spinal cord trial and permanent placement of an MRI compatible SCS if he has significant improvement of his pain. He will also start a course of physical therapy. Follow up as needed

## 2024-06-18 ENCOUNTER — NON-APPOINTMENT (OUTPATIENT)
Age: 74
End: 2024-06-18

## 2024-07-08 DIAGNOSIS — R97.21 RISING PSA FOLLOWING TREATMENT FOR MALIGNANT NEOPLASM OF PROSTATE: ICD-10-CM

## 2024-10-16 ENCOUNTER — NON-APPOINTMENT (OUTPATIENT)
Age: 74
End: 2024-10-16

## 2024-11-08 DIAGNOSIS — R97.21 RISING PSA FOLLOWING TREATMENT FOR MALIGNANT NEOPLASM OF PROSTATE: ICD-10-CM

## 2024-11-11 ENCOUNTER — APPOINTMENT (OUTPATIENT)
Dept: UROLOGY | Facility: CLINIC | Age: 74
End: 2024-11-11

## 2024-12-03 ENCOUNTER — NON-APPOINTMENT (OUTPATIENT)
Age: 74
End: 2024-12-03

## 2024-12-03 DIAGNOSIS — R79.89 OTHER SPECIFIED ABNORMAL FINDINGS OF BLOOD CHEMISTRY: ICD-10-CM

## 2024-12-17 ENCOUNTER — OUTPATIENT (OUTPATIENT)
Dept: OUTPATIENT SERVICES | Facility: HOSPITAL | Age: 74
LOS: 1 days | End: 2024-12-17
Payer: COMMERCIAL

## 2024-12-17 ENCOUNTER — APPOINTMENT (OUTPATIENT)
Dept: ULTRASOUND IMAGING | Facility: CLINIC | Age: 74
End: 2024-12-17
Payer: MEDICARE

## 2024-12-17 DIAGNOSIS — Z98.89 OTHER SPECIFIED POSTPROCEDURAL STATES: Chronic | ICD-10-CM

## 2024-12-17 DIAGNOSIS — H53.009 UNSPECIFIED AMBLYOPIA, UNSPECIFIED EYE: Chronic | ICD-10-CM

## 2024-12-17 DIAGNOSIS — H26.9 UNSPECIFIED CATARACT: Chronic | ICD-10-CM

## 2024-12-17 DIAGNOSIS — R79.89 OTHER SPECIFIED ABNORMAL FINDINGS OF BLOOD CHEMISTRY: ICD-10-CM

## 2024-12-17 DIAGNOSIS — Z98.1 ARTHRODESIS STATUS: Chronic | ICD-10-CM

## 2024-12-17 PROCEDURE — 76775 US EXAM ABDO BACK WALL LIM: CPT | Mod: 26

## 2024-12-17 PROCEDURE — 76775 US EXAM ABDO BACK WALL LIM: CPT

## 2025-01-25 DIAGNOSIS — R35.0 FREQUENCY OF MICTURITION: ICD-10-CM

## 2025-01-25 DIAGNOSIS — R97.21 RISING PSA FOLLOWING TREATMENT FOR MALIGNANT NEOPLASM OF PROSTATE: ICD-10-CM

## 2025-01-29 ENCOUNTER — NON-APPOINTMENT (OUTPATIENT)
Age: 75
End: 2025-01-29

## 2025-01-30 ENCOUNTER — APPOINTMENT (OUTPATIENT)
Dept: UROLOGY | Facility: CLINIC | Age: 75
End: 2025-01-30

## 2025-01-30 RX ORDER — VIBEGRON 75 MG/1
75 TABLET, FILM COATED ORAL
Qty: 90 | Refills: 3 | Status: ACTIVE | COMMUNITY
Start: 2025-01-25 | End: 1900-01-01

## 2025-02-05 ENCOUNTER — NON-APPOINTMENT (OUTPATIENT)
Age: 75
End: 2025-02-05

## 2025-02-24 ENCOUNTER — NON-APPOINTMENT (OUTPATIENT)
Age: 75
End: 2025-02-24

## 2025-04-28 ENCOUNTER — NON-APPOINTMENT (OUTPATIENT)
Age: 75
End: 2025-04-28

## 2025-04-30 ENCOUNTER — APPOINTMENT (OUTPATIENT)
Dept: UROLOGY | Facility: CLINIC | Age: 75
End: 2025-04-30
Payer: MEDICARE

## 2025-04-30 VITALS
DIASTOLIC BLOOD PRESSURE: 64 MMHG | RESPIRATION RATE: 16 BRPM | SYSTOLIC BLOOD PRESSURE: 171 MMHG | HEART RATE: 102 BPM | TEMPERATURE: 98.2 F

## 2025-04-30 DIAGNOSIS — R97.21 RISING PSA FOLLOWING TREATMENT FOR MALIGNANT NEOPLASM OF PROSTATE: ICD-10-CM

## 2025-04-30 DIAGNOSIS — R35.0 FREQUENCY OF MICTURITION: ICD-10-CM

## 2025-04-30 PROCEDURE — G2211 COMPLEX E/M VISIT ADD ON: CPT

## 2025-04-30 PROCEDURE — 99213 OFFICE O/P EST LOW 20 MIN: CPT

## 2025-04-30 RX ORDER — MIRABEGRON 50 MG/1
50 TABLET, FILM COATED, EXTENDED RELEASE ORAL
Qty: 90 | Refills: 3 | Status: ACTIVE | COMMUNITY
Start: 2025-04-30 | End: 1900-01-01

## 2025-06-11 ENCOUNTER — NON-APPOINTMENT (OUTPATIENT)
Age: 75
End: 2025-06-11

## 2025-06-11 ENCOUNTER — APPOINTMENT (OUTPATIENT)
Age: 75
End: 2025-06-11
Payer: MEDICARE

## 2025-06-11 PROCEDURE — 92202 OPSCPY EXTND ON/MAC DRAW: CPT

## 2025-06-11 PROCEDURE — 92014 COMPRE OPH EXAM EST PT 1/>: CPT

## 2025-06-11 PROCEDURE — 92134 CPTRZ OPH DX IMG PST SGM RTA: CPT
